# Patient Record
Sex: FEMALE | Race: WHITE | Employment: FULL TIME
[De-identification: names, ages, dates, MRNs, and addresses within clinical notes are randomized per-mention and may not be internally consistent; named-entity substitution may affect disease eponyms.]

---

## 2017-06-24 ENCOUNTER — HEALTH MAINTENANCE LETTER (OUTPATIENT)
Age: 42
End: 2017-06-24

## 2018-07-17 ENCOUNTER — NURSE TRIAGE (OUTPATIENT)
Dept: NURSING | Facility: CLINIC | Age: 43
End: 2018-07-17

## 2018-07-17 NOTE — TELEPHONE ENCOUNTER
FNA Triage Call  Presenting Problem:Pt called from [phone] about  ingrown toenail , that is worsen after self treatment in the last 24 hours , skin was growing over the nail  and  no fever .   Hx of this Nail removed x 2 , Right big toe and got a fungus and have  partially grown back but it  got ingrown . Currently : painful R big toe - look red &  swollen.     Guideline Used: ingrown toe   Patient Recommendations/Teaching: see provider in 3 days and sent to  .  .Jeny Quick RN [place] nurse advisors.            Reason for Disposition    [1] MODERATE pain (e.g., limping, interferes with normal activities) AND [2] can't locate corner of toenail    Additional Information    Negative: Doesn't match the SYMPTOMS for ingrown toenail    Negative: Patient sounds very sick or weak to the triager    Negative: [1] Looks infected (e.g., spreading redness, red streak, pus) AND [2] fever    Negative: [1] Red streaking AND [2] longer than 1 inch (2.5 cm)    Negative: [1] Skin around the nail has become red AND [2] larger than 2 inches (5 cm)    Negative: Entire toe is red    Negative: Entire toe is swollen    Negative: Yellow pus seen in skin around toenail (cuticle area), or pus seen under toenail    Negative: [1] MODERATE pain (e.g., limping, interferes with normal activities) AND [2] present > 3 days    Protocols used: TOENAIL - INGROWN-ADULT-

## 2019-10-03 ENCOUNTER — HEALTH MAINTENANCE LETTER (OUTPATIENT)
Age: 44
End: 2019-10-03

## 2020-06-03 ENCOUNTER — AMBULATORY - HEALTHEAST (OUTPATIENT)
Dept: FAMILY MEDICINE | Facility: CLINIC | Age: 45
End: 2020-06-03

## 2020-06-03 ENCOUNTER — AMBULATORY - HEALTHEAST (OUTPATIENT)
Dept: INTERNAL MEDICINE | Facility: CLINIC | Age: 45
End: 2020-06-03

## 2020-06-03 DIAGNOSIS — Z11.59 ENCOUNTER FOR SCREENING FOR OTHER VIRAL DISEASES: Primary | ICD-10-CM

## 2020-06-03 DIAGNOSIS — Z11.59 ENCOUNTER FOR SCREENING FOR OTHER VIRAL DISEASES: ICD-10-CM

## 2020-06-03 LAB
SARS-COV-2 PCR COMMENT: NORMAL
SARS-COV-2 RNA SPEC QL NAA+PROBE: NEGATIVE
SARS-COV-2 VIRUS SPECIMEN SOURCE: NORMAL

## 2020-06-03 RX ORDER — BUDESONIDE 3 MG/1
9 CAPSULE, COATED PELLETS ORAL EVERY MORNING
COMMUNITY

## 2020-06-03 RX ORDER — MULTIPLE VITAMINS W/ MINERALS TAB 9MG-400MCG
1 TAB ORAL DAILY
COMMUNITY

## 2020-06-03 RX ORDER — CHOLECALCIFEROL (VITAMIN D3) 50 MCG
1 TABLET ORAL DAILY
COMMUNITY

## 2020-06-04 ENCOUNTER — HOSPITAL ENCOUNTER (OUTPATIENT)
Facility: CLINIC | Age: 45
Discharge: HOME | End: 2020-06-04
Attending: COLON & RECTAL SURGERY | Admitting: COLON & RECTAL SURGERY
Payer: COMMERCIAL

## 2020-06-04 ENCOUNTER — ANESTHESIA (OUTPATIENT)
Dept: SURGERY | Facility: CLINIC | Age: 45
End: 2020-06-04
Payer: COMMERCIAL

## 2020-06-04 ENCOUNTER — ANESTHESIA EVENT (OUTPATIENT)
Dept: SURGERY | Facility: CLINIC | Age: 45
End: 2020-06-04
Payer: COMMERCIAL

## 2020-06-04 VITALS
SYSTOLIC BLOOD PRESSURE: 101 MMHG | WEIGHT: 142 LBS | RESPIRATION RATE: 15 BRPM | DIASTOLIC BLOOD PRESSURE: 75 MMHG | OXYGEN SATURATION: 93 % | TEMPERATURE: 98.8 F

## 2020-06-04 DIAGNOSIS — K50.113 CROHN'S DISEASE OF LARGE INTESTINE WITH FISTULA (H): Primary | ICD-10-CM

## 2020-06-04 DIAGNOSIS — K60.30 ANAL FISTULA: ICD-10-CM

## 2020-06-04 LAB — HCG UR QL: NEGATIVE

## 2020-06-04 PROCEDURE — 81025 URINE PREGNANCY TEST: CPT | Performed by: ANESTHESIOLOGY

## 2020-06-04 PROCEDURE — 37000008 ZZH ANESTHESIA TECHNICAL FEE, 1ST 30 MIN: Performed by: COLON & RECTAL SURGERY

## 2020-06-04 PROCEDURE — 25000128 H RX IP 250 OP 636

## 2020-06-04 PROCEDURE — 37000009 ZZH ANESTHESIA TECHNICAL FEE, EACH ADDTL 15 MIN: Performed by: COLON & RECTAL SURGERY

## 2020-06-04 PROCEDURE — 25000132 ZZH RX MED GY IP 250 OP 250 PS 637: Performed by: COLON & RECTAL SURGERY

## 2020-06-04 PROCEDURE — 25800030 ZZH RX IP 258 OP 636: Performed by: ANESTHESIOLOGY

## 2020-06-04 PROCEDURE — 71000027 ZZH RECOVERY PHASE 2 EACH 15 MINS: Performed by: COLON & RECTAL SURGERY

## 2020-06-04 PROCEDURE — 40000306 ZZH STATISTIC PRE PROC ASSESS II: Performed by: COLON & RECTAL SURGERY

## 2020-06-04 PROCEDURE — 25000125 ZZHC RX 250

## 2020-06-04 PROCEDURE — 25000125 ZZHC RX 250: Performed by: COLON & RECTAL SURGERY

## 2020-06-04 PROCEDURE — 27210794 ZZH OR GENERAL SUPPLY STERILE: Performed by: COLON & RECTAL SURGERY

## 2020-06-04 PROCEDURE — 25000125 ZZHC RX 250: Performed by: ANESTHESIOLOGY

## 2020-06-04 PROCEDURE — 36000052 ZZH SURGERY LEVEL 2 EA 15 ADDTL MIN: Performed by: COLON & RECTAL SURGERY

## 2020-06-04 PROCEDURE — 36000050 ZZH SURGERY LEVEL 2 1ST 30 MIN: Performed by: COLON & RECTAL SURGERY

## 2020-06-04 RX ORDER — KETAMINE HYDROCHLORIDE 10 MG/ML
INJECTION, SOLUTION INTRAMUSCULAR; INTRAVENOUS PRN
Status: DISCONTINUED | OUTPATIENT
Start: 2020-06-04 | End: 2020-06-04

## 2020-06-04 RX ORDER — ONDANSETRON 4 MG/1
4 TABLET, ORALLY DISINTEGRATING ORAL
Status: DISCONTINUED | OUTPATIENT
Start: 2020-06-04 | End: 2020-06-04 | Stop reason: HOSPADM

## 2020-06-04 RX ORDER — FENTANYL CITRATE 50 UG/ML
25-50 INJECTION, SOLUTION INTRAMUSCULAR; INTRAVENOUS
Status: DISCONTINUED | OUTPATIENT
Start: 2020-06-04 | End: 2020-06-04 | Stop reason: HOSPADM

## 2020-06-04 RX ORDER — KETOROLAC TROMETHAMINE 30 MG/ML
INJECTION, SOLUTION INTRAMUSCULAR; INTRAVENOUS PRN
Status: DISCONTINUED | OUTPATIENT
Start: 2020-06-04 | End: 2020-06-04

## 2020-06-04 RX ORDER — ONDANSETRON 2 MG/ML
INJECTION INTRAMUSCULAR; INTRAVENOUS PRN
Status: DISCONTINUED | OUTPATIENT
Start: 2020-06-04 | End: 2020-06-04

## 2020-06-04 RX ORDER — ONDANSETRON 4 MG/1
4 TABLET, ORALLY DISINTEGRATING ORAL EVERY 30 MIN PRN
Status: DISCONTINUED | OUTPATIENT
Start: 2020-06-04 | End: 2020-06-04 | Stop reason: HOSPADM

## 2020-06-04 RX ORDER — OXYCODONE HYDROCHLORIDE 5 MG/1
5 TABLET ORAL EVERY 4 HOURS PRN
Status: DISCONTINUED | OUTPATIENT
Start: 2020-06-04 | End: 2020-06-04 | Stop reason: HOSPADM

## 2020-06-04 RX ORDER — LIDOCAINE HYDROCHLORIDE 10 MG/ML
INJECTION, SOLUTION INFILTRATION; PERINEURAL PRN
Status: DISCONTINUED | OUTPATIENT
Start: 2020-06-04 | End: 2020-06-04

## 2020-06-04 RX ORDER — OXYCODONE HYDROCHLORIDE 5 MG/1
5-10 TABLET ORAL
Qty: 25 TABLET | Refills: 0 | Status: SHIPPED | OUTPATIENT
Start: 2020-06-04

## 2020-06-04 RX ORDER — FENTANYL CITRATE 50 UG/ML
INJECTION, SOLUTION INTRAMUSCULAR; INTRAVENOUS PRN
Status: DISCONTINUED | OUTPATIENT
Start: 2020-06-04 | End: 2020-06-04

## 2020-06-04 RX ORDER — SODIUM CHLORIDE, SODIUM LACTATE, POTASSIUM CHLORIDE, CALCIUM CHLORIDE 600; 310; 30; 20 MG/100ML; MG/100ML; MG/100ML; MG/100ML
INJECTION, SOLUTION INTRAVENOUS CONTINUOUS
Status: DISCONTINUED | OUTPATIENT
Start: 2020-06-04 | End: 2020-06-04 | Stop reason: HOSPADM

## 2020-06-04 RX ORDER — GLYCOPYRROLATE 0.2 MG/ML
INJECTION, SOLUTION INTRAMUSCULAR; INTRAVENOUS PRN
Status: DISCONTINUED | OUTPATIENT
Start: 2020-06-04 | End: 2020-06-04

## 2020-06-04 RX ORDER — ONDANSETRON 2 MG/ML
4 INJECTION INTRAMUSCULAR; INTRAVENOUS EVERY 30 MIN PRN
Status: DISCONTINUED | OUTPATIENT
Start: 2020-06-04 | End: 2020-06-04 | Stop reason: HOSPADM

## 2020-06-04 RX ORDER — ACETAMINOPHEN 325 MG/1
975 TABLET ORAL ONCE
Status: COMPLETED | OUTPATIENT
Start: 2020-06-04 | End: 2020-06-04

## 2020-06-04 RX ORDER — IBUPROFEN 600 MG/1
600 TABLET, FILM COATED ORAL
Status: DISCONTINUED | OUTPATIENT
Start: 2020-06-04 | End: 2020-06-04 | Stop reason: HOSPADM

## 2020-06-04 RX ORDER — ALBUTEROL SULFATE 0.83 MG/ML
2.5 SOLUTION RESPIRATORY (INHALATION) EVERY 4 HOURS PRN
Status: DISCONTINUED | OUTPATIENT
Start: 2020-06-04 | End: 2020-06-04 | Stop reason: HOSPADM

## 2020-06-04 RX ORDER — LIDOCAINE 50 MG/G
OINTMENT TOPICAL PRN
Qty: 60 G | Refills: 2 | Status: SHIPPED | OUTPATIENT
Start: 2020-06-04

## 2020-06-04 RX ORDER — LIDOCAINE 40 MG/G
CREAM TOPICAL
Status: DISCONTINUED | OUTPATIENT
Start: 2020-06-04 | End: 2020-06-04 | Stop reason: HOSPADM

## 2020-06-04 RX ORDER — FENTANYL CITRATE 50 UG/ML
25-50 INJECTION, SOLUTION INTRAMUSCULAR; INTRAVENOUS EVERY 5 MIN PRN
Status: DISCONTINUED | OUTPATIENT
Start: 2020-06-04 | End: 2020-06-04 | Stop reason: HOSPADM

## 2020-06-04 RX ORDER — NALOXONE HYDROCHLORIDE 0.4 MG/ML
.1-.4 INJECTION, SOLUTION INTRAMUSCULAR; INTRAVENOUS; SUBCUTANEOUS
Status: DISCONTINUED | OUTPATIENT
Start: 2020-06-04 | End: 2020-06-04 | Stop reason: HOSPADM

## 2020-06-04 RX ORDER — MEPERIDINE HYDROCHLORIDE 25 MG/ML
12.5 INJECTION INTRAMUSCULAR; INTRAVENOUS; SUBCUTANEOUS
Status: DISCONTINUED | OUTPATIENT
Start: 2020-06-04 | End: 2020-06-04 | Stop reason: HOSPADM

## 2020-06-04 RX ORDER — PROPOFOL 10 MG/ML
INJECTION, EMULSION INTRAVENOUS CONTINUOUS PRN
Status: DISCONTINUED | OUTPATIENT
Start: 2020-06-04 | End: 2020-06-04

## 2020-06-04 RX ORDER — HYDROMORPHONE HYDROCHLORIDE 1 MG/ML
.3-.5 INJECTION, SOLUTION INTRAMUSCULAR; INTRAVENOUS; SUBCUTANEOUS EVERY 10 MIN PRN
Status: DISCONTINUED | OUTPATIENT
Start: 2020-06-04 | End: 2020-06-04 | Stop reason: HOSPADM

## 2020-06-04 RX ORDER — SCOLOPAMINE TRANSDERMAL SYSTEM 1 MG/1
1 PATCH, EXTENDED RELEASE TRANSDERMAL ONCE
Status: DISCONTINUED | OUTPATIENT
Start: 2020-06-04 | End: 2020-06-04 | Stop reason: HOSPADM

## 2020-06-04 RX ORDER — BUPIVACAINE HYDROCHLORIDE AND EPINEPHRINE 5; 5 MG/ML; UG/ML
INJECTION, SOLUTION PERINEURAL PRN
Status: DISCONTINUED | OUTPATIENT
Start: 2020-06-04 | End: 2020-06-04 | Stop reason: HOSPADM

## 2020-06-04 RX ADMIN — LIDOCAINE HYDROCHLORIDE 50 MG: 10 INJECTION, SOLUTION INFILTRATION; PERINEURAL at 12:48

## 2020-06-04 RX ADMIN — FENTANYL CITRATE 25 MCG: 50 INJECTION, SOLUTION INTRAMUSCULAR; INTRAVENOUS at 12:55

## 2020-06-04 RX ADMIN — KETOROLAC TROMETHAMINE 30 MG: 30 INJECTION, SOLUTION INTRAMUSCULAR at 13:01

## 2020-06-04 RX ADMIN — KETAMINE HYDROCHLORIDE 10 MG: 10 INJECTION, SOLUTION INTRAMUSCULAR; INTRAVENOUS at 12:47

## 2020-06-04 RX ADMIN — FENTANYL CITRATE 25 MCG: 50 INJECTION, SOLUTION INTRAMUSCULAR; INTRAVENOUS at 13:02

## 2020-06-04 RX ADMIN — KETAMINE HYDROCHLORIDE 10 MG: 10 INJECTION, SOLUTION INTRAMUSCULAR; INTRAVENOUS at 12:52

## 2020-06-04 RX ADMIN — GLYCOPYRROLATE 0.3 MG: 0.2 INJECTION, SOLUTION INTRAMUSCULAR; INTRAVENOUS at 12:55

## 2020-06-04 RX ADMIN — MIDAZOLAM 2 MG: 1 INJECTION INTRAMUSCULAR; INTRAVENOUS at 12:36

## 2020-06-04 RX ADMIN — SODIUM CHLORIDE, POTASSIUM CHLORIDE, SODIUM LACTATE AND CALCIUM CHLORIDE: 600; 310; 30; 20 INJECTION, SOLUTION INTRAVENOUS at 12:01

## 2020-06-04 RX ADMIN — PROPOFOL 140 MCG/KG/MIN: 10 INJECTION, EMULSION INTRAVENOUS at 12:50

## 2020-06-04 RX ADMIN — ACETAMINOPHEN 975 MG: 325 TABLET, FILM COATED ORAL at 11:40

## 2020-06-04 RX ADMIN — FENTANYL CITRATE 50 MCG: 50 INJECTION, SOLUTION INTRAMUSCULAR; INTRAVENOUS at 12:42

## 2020-06-04 RX ADMIN — ONDANSETRON HYDROCHLORIDE 4 MG: 2 INJECTION, SOLUTION INTRAVENOUS at 12:53

## 2020-06-04 RX ADMIN — SCOPALAMINE 1 PATCH: 1 PATCH, EXTENDED RELEASE TRANSDERMAL at 11:35

## 2020-06-04 NOTE — ANESTHESIA CARE TRANSFER NOTE
Patient: Suri Mei    Procedure(s):  exam under anesthesia, incision and drainage of perirectal abscess with partial fistulotomy  placement of seton stitch    Diagnosis: Perirectal abscess [K61.1]  Diagnosis Additional Information: No value filed.    Anesthesia Type:   MAC     Note:  Airway :Face Mask  Patient transferred to:Phase II  Comments: Pt to Phae 2 recovery, VS, report to RNHandoff Report: Identifed the Patient, Identified the Reponsible Provider, Reviewed the pertinent medical history, Discussed the surgical course, Reviewed Intra-OP anesthesia mangement and issues during anesthesia, Set expectations for post-procedure period and Allowed opportunity for questions and acknowledgement of understanding      Vitals: (Last set prior to Anesthesia Care Transfer)    CRNA VITALS  6/4/2020 1254 - 6/4/2020 1333      6/4/2020             Resp Rate (observed):  (!) 1                Electronically Signed By: JAIME Diaz CRNA  June 4, 2020  1:33 PM

## 2020-06-04 NOTE — H&P
Pre-op History and Physical     Suri Mei MRN# [id number]   YOB: 1975 Age: 44 year old     Date of Procedure: 6/4/2020  Primary care provider: Anh Miller  Type surgery: EUA, I&D, placement of seton  Reason for Procedure: recurrent anal pain, suspected anal abscess, crohns disease  Type of Anesthesia Anticipated: Moderate Sedation    HPI:    Suri is a 44 year old female who will be undergoing the above procedure.  She has a history of untreated Crohns disease.  She has ahistory of anal pain.  Recent imaging and exam demonstrated a possible fistula in ano.  She was advised to have an exam under anesthesia.      The risks and benefits of surgery were discussed.     Allergies   Allergen Reactions     Compazine [Prochlorperazine] Other (See Comments)     dystonia     Imuran [Azathioprine] Other (See Comments)     Fever; joint pain; vomiting     Mercaptopurine Other (See Comments)     Fever; joint pain; vomiting        Current Facility-Administered Medications   Medication     lactated ringers infusion     lidocaine (LMX4) cream     lidocaine 1 % 0.1-1 mL     PRE OP antibiotics NOT needed for this surgical procedure     scopolamine (TRANSDERM) 72 hr patch 1 patch    And     scopolamine (TRANSDERM-SCOP) Patch in Place     sodium chloride (PF) 0.9% PF flush 3 mL     sodium chloride (PF) 0.9% PF flush 3 mL       Medications Prior to Admission   Medication Sig Dispense Refill Last Dose     Amoxicillin-Pot Clavulanate (AUGMENTIN PO) Take 875 mg by mouth every 12 hours   6/3/2020 at Unknown time     budesonide (ENTOCORT EC) 3 MG EC capsule Take 9 mg by mouth every morning   Past Week at Unknown time     multivitamin w/minerals (MULTI-VITAMIN) tablet Take 1 tablet by mouth daily   6/3/2020 at Unknown time     vitamin D3 (CHOLECALCIFEROL) 2000 units (50 mcg) tablet Take 1 tablet by mouth daily   6/3/2020 at Unknown time       There is no problem list on file for this patient.       Past Medical  History:   Diagnosis Date     Anemia      Complication of anesthesia     BP dropped with general anesthesia; needs more lidocaine than usual; epidural caused nausea     Noninfectious ileitis     Chron's     PONV (postoperative nausea and vomiting)      Rectal fistula      Uncomplicated asthma     sports induced        Past Surgical History:   Procedure Laterality Date     ABDOMEN SURGERY  2007    CSection     APPENDECTOMY       AS INDUCED ABORTN BY D&C       ENT SURGERY      T & A, Tubes in Ears     GI SURGERY      Colonoscpy; Endoscopy     GYN SURGERY  1988    ovarian cystectomy       Social History     Tobacco Use     Smoking status: Former Smoker     Years: 10.00     Types: Cigarettes     Smokeless tobacco: Never Used     Tobacco comment: quit June 2006   Substance Use Topics     Alcohol use: Not Currently       History reviewed. No pertinent family history.      PHYSICAL EXAM:   /69   Temp 99  F (37.2  C) (Temporal)   Resp 20   Wt 64.4 kg (142 lb)   LMP 05/12/2020   SpO2 95%  There is no height or weight on file to calculate BMI.   Mental status - alert and oriented  RESP: lungs clear  CV: RRR  AIRWAY EXAM: Mallampatti Class II (visualization of the soft palate, fauces, and uvula)  Neck ROM full  HEENT: pupils anicteric, PERRLA    IMPRESSION   ASA Class 2 - Mild systemic disease  OK for surgery.      Signed Electronically by: Checo Lo MD  June 4, 2020    Colorectal Surgery  [phone] (office)  [phone] (pager)  [email or web address]

## 2020-06-04 NOTE — ANESTHESIA PREPROCEDURE EVALUATION
Anesthesia Pre-Procedure Evaluation    Patient: Suri Mei   MRN: [id number] : 1975          Preoperative Diagnosis: Perirectal abscess [K61.1]    Procedure(s):  exam under anesthesia, possible incision and drainage of perirectal abscess  possible placement of seton    Past Medical History:   Diagnosis Date     Anemia      Complication of anesthesia     BP dropped with general anesthesia; needs more lidocaine than usual; epidural caused nausea     Noninfectious ileitis     Chron's     PONV (postoperative nausea and vomiting)      Rectal fistula      Uncomplicated asthma     sports induced     Past Surgical History:   Procedure Laterality Date     ABDOMEN SURGERY      CSection     APPENDECTOMY       AS INDUCED ABORTN BY D&C       ENT SURGERY      T & A, Tubes in Ears     GI SURGERY      Colonoscpy; Endoscopy     GYN SURGERY      ovarian cystectomy     Anesthesia Evaluation     . Pt has had prior anesthetic. Type: General    History of anesthetic complications   - PONV        ROS/MED HX    ENT/Pulmonary:     (+)Intermittent asthma Treatment: Inhaler prn,  , . .   (-) recent URI   Neurologic:  - neg neurologic ROS    (-) Neuropathy   Cardiovascular:  - neg cardiovascular ROS      (-) hypertension, syncope and irregular heartbeat/palpitations   METS/Exercise Tolerance:     Hematologic:     (+) Anemia, -      Musculoskeletal:  - neg musculoskeletal ROS       GI/Hepatic:     (+) Inflammatory bowel disease ( Chrons),       Renal/Genitourinary:  - ROS Renal section negative       Endo:  - neg endo ROS       Psychiatric:     (+) psychiatric history anxiety      Infectious Disease:  - neg infectious disease ROS       Malignancy:         Other:                          Physical Exam  Normal systems: cardiovascular, pulmonary and dental    Airway   Mallampati: II  TM distance: >3 FB  Neck ROM: full    Dental     Cardiovascular       Pulmonary             Lab Results   Component Value Date    WBC 6.0  09/10/2014    HGB 13.2 09/10/2014    HCT 39.1 09/10/2014     09/10/2014     09/10/2014    POTASSIUM 3.9 09/10/2014    CHLORIDE 103 09/10/2014    CO2 30 09/10/2014    BUN 16 09/10/2014    CR 0.85 09/10/2014    GLC 85 09/10/2014    MIKE 8.9 09/10/2014    ALBUMIN 3.9 10/04/2008    PROTTOTAL 7.3 10/04/2008    ALT 26 10/04/2008    AST 28 10/04/2008    ALKPHOS 61 10/04/2008    BILITOTAL 0.3 10/04/2008    LIPASE 141 10/04/2008    INR 0.99 10/04/2008    HCG Negative 06/04/2020       Preop Vitals  BP Readings from Last 3 Encounters:   06/04/20 110/69   09/10/14 100/67    Pulse Readings from Last 3 Encounters:   No data found for Pulse      Resp Readings from Last 3 Encounters:   06/04/20 20   09/10/14 18    SpO2 Readings from Last 3 Encounters:   06/04/20 95%   09/10/14 98%      Temp Readings from Last 1 Encounters:   06/04/20 99  F (37.2  C) (Temporal)    Ht Readings from Last 1 Encounters:   No data found for Ht      Wt Readings from Last 1 Encounters:   06/04/20 64.4 kg (142 lb)    There is no height or weight on file to calculate BMI.       Anesthesia Plan      History & Physical Review  History and physical reviewed and following examination; no interval change.    ASA Status:  2 .    NPO Status:  > 8 hours    Plan for MAC Reason for MAC:  Deep or markedly invasive procedure (G8) and Extreme anxiety (QS)  PONV prophylaxis:  Ondansetron (or other 5HT-3), Dexamethasone or Solumedrol and Scopolamine patch         Postoperative Care  Postoperative pain management:  IV analgesics.      Consents  Anesthetic plan, risks, benefits and alternatives discussed with:  Patient..                 Reese Vazquez MD                    .

## 2020-06-04 NOTE — BRIEF OP NOTE
[facility]     Brief Operative Note    Pre-operative diagnosis: Perirectal abscess [K61.1]  Post-operative diagnosis perirectal abscess, fistula in ano    Procedure: Procedure(s):  exam under anesthesia, incision and drainage of perirectal abscess with partial fistulotomy  placement of seton stitch  Surgeon: Surgeon(s) and Role:     * Checo Lo MD - Primary  Anesthesia: Monitor Anesthesia Care   Estimated blood loss: Less than 50 ml  Drains: None  Specimens: * No specimens in log *  Findings:   None.  Complications: None.  Implants: * No implants in log *

## 2020-06-04 NOTE — DISCHARGE INSTRUCTIONS
DR. NAM PRYOR M.D.  [facility] PHONE NUMBER:  [phone]    GENERAL ANESTHESIA OR SEDATION ADULT DISCHARGE INSTRUCTIONS   SPECIAL PRECAUTIONS FOR 24 HOURS AFTER SURGERY    IT IS NOT UNUSUAL TO FEEL LIGHT-HEADED OR FAINT, UP TO 24 HOURS AFTER SURGERY OR WHILE TAKING PAIN MEDICATION.  IF YOU HAVE THESE SYMPTOMS; SIT FOR A FEW MINUTES BEFORE STANDING AND HAVE SOMEONE ASSIST YOU WHEN YOU GET UP TO WALK OR USE THE BATHROOM.    YOU SHOULD REST AND RELAX FOR THE NEXT 24 HOURS AND YOU MUST MAKE ARRANGEMENTS TO HAVE SOMEONE STAY WITH YOU FOR AT LEAST 24 HOURS AFTER YOUR DISCHARGE.  AVOID HAZARDOUS AND STRENUOUS ACTIVITIES.  DO NOT MAKE IMPORTANT DECISIONS FOR 24 HOURS.    DO NOT DRIVE ANY VEHICLE OR OPERATE MECHANICAL EQUIPMENT FOR 24 HOURS FOLLOWING THE END OF YOUR SURGERY.  EVEN THOUGH YOU MAY FEEL NORMAL, YOUR REACTIONS MAY BE AFFECTED BY THE MEDICATION YOU HAVE RECEIVED.    DO NOT DRINK ALCOHOLIC BEVERAGES FOR 24 HOURS FOLLOWING YOUR SURGERY.    DRINK CLEAR LIQUIDS (APPLE JUICE, GINGER ALE, 7-UP, BROTH, ETC.).  PROGRESS TO YOUR REGULAR DIET AS YOU FEEL ABLE.    YOU MAY HAVE A DRY MOUTH, A SORE THROAT, MUSCLES ACHES OR TROUBLE SLEEPING.  THESE SHOULD GO AWAY AFTER 24 HOURS.    CALL YOUR DOCTOR FOR ANY OF THE FOLLOWING:  SIGNS OF INFECTION (FEVER, GROWING TENDERNESS AT THE SURGERY SITE, A LARGE AMOUNT OF DRAINAGE OR BLEEDING, SEVERE PAIN, FOUL-SMELLING DRAINAGE, REDNESS OR SWELLING.    IT HAS BEEN OVER 8 TO 10 HOURS SINCE SURGERY AND YOU ARE STILL NOT ABLE TO URINATE (PASS WATER).     You received Toradol, an IV form of ibuprofen (Motrin) at 1:00 PM.  Do not take any ibuprofen products until 7:00 PM  Transderm Scop (Scopolamine) Patch    The Transderm Scop patch placed behind your ear helps to prevent nausea and vomiting associated with the use of anesthesia and certain analgesics used during or after many types surgery.  You will need to remove this patch after 3 days.  However, it may be removed sooner if you are no  longer concerned about nausea or vomiting.      The most common side effects:  ?  dryness of the mouth  ?  drowsiness  ?  blurred vision  ?  dilation of pupils  ?  disorientation, memory disturbances and/or confusion  ?  dizziness  ?  restlessness  ?  hallucinations  ?  difficulty urinating  ?  skin rash  ?  red or painful eyes    Avoid drinking alcohol while using Transderm Scop patch.  Be careful about driving or operating any machinery while using this medication since it may make you drowsy.  In the unlikely event that you experience pain in the eye, which may be accompanied by widening of the pupil, eye redness and blurred vision, remove the Transderm Scop Patch immediately and consult your doctor.    Removal of Transderm Scop Patch:  To remove the patch simply peel it off your skin.  Be sure to wash your hands and the area behind your ear thoroughly with soap and water.  The Transderm Scop Patch will continue to have some active ingredient after use.  Therefore, to avoid accidental contact or ingestion by children or pets, fold the used patch in half with the sticky side together and dispose in the trash out of reach of children and pets.  If you wear contact lens, be sure to wash your hands first before handling contact lens.      Removal date:____________June 7__________.

## 2020-06-04 NOTE — ANESTHESIA POSTPROCEDURE EVALUATION
Patient: Suri Mei    Procedure(s):  exam under anesthesia, incision and drainage of perirectal abscess with partial fistulotomy  placement of seton stitch    Diagnosis:Perirectal abscess [K61.1]  Diagnosis Additional Information: No value filed.    Anesthesia Type:  MAC    Note:  Anesthesia Post Evaluation    Patient location during evaluation: Phase 2  Patient participation: Able to fully participate in evaluation  Level of consciousness: awake and alert  Pain management: adequate  Airway patency: patent  Cardiovascular status: acceptable  Respiratory status: acceptable  Hydration status: acceptable  PONV: controlled             Last vitals:  Vitals:    06/04/20 1329 06/04/20 1344 06/04/20 1400   BP: 112/75 (!) 113/90 115/82   Resp: 14 15 16   Temp: 97.3  F (36.3  C)  99.3  F (37.4  C)   SpO2: 100% 99% 95%         Electronically Signed By: Reese Vazquez MD  June 4, 2020  2:23 PM

## 2020-06-05 NOTE — OP NOTE
Procedure Date: 06/04/2020      PREOPERATIVE DIAGNOSES:   1.  Crohn's disease.   2.  Suspected fistula in ano with perirectal abscess.      POSTOPERATIVE DIAGNOSES:   1.  Left anterior ischiorectal fossa abscess.   2.  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   3.  Crohn's disease.   4.  Anal skin tags.   5.  Left lateral hypertrophied anal papilla.      PROCEDURES:   1.  Examination under anesthesia.   2.  Incision and drainage of left ischiorectal fossa abscess.   3.  Placement of a seton.   4.  Partial fistulotomy without division of muscle.      STAFF SURGEON:  Checo Lo MD      ANESTHESIA:  Monitored anesthesia care.      ESTIMATED BLOOD LOSS:  50 mL.      SPECIMENS:  None.      INDICATIONS:  Suri is a 44-year-old female with a history of untreated Crohn's disease.  She has Crohn's colitis as well as a new suspected fistula in ano with recurrent anal pain suspicious of a perirectal abscess.  She presents to the operating room today for an examination under anesthesia, possible incision and drainage of abscess, possible placement of a seton.  The risks of surgery including the risk of bleeding, infection, recurrence and alteration in control of gas and liquid stool were discussed as well as expectations regarding postoperative pain recovery were discussed as well.  She wishes to proceed.      DESCRIPTION OF PROCEDURE:  After obtaining informed consent, the patient was brought to the operating room.  She was placed in prone jackknife position on the operating table.  All pressure points were inspected and padded appropriately.  The buttocks were spread with tape, the perineum prepped and draped in the usual sterile fashion after the patient was deeply sedated.  A timeout was undertaken, which identified the patient and correct procedure.      We began by infiltrating the perirectal tissues with 30 mL of 0.5% Marcaine with dilute epinephrine for local anesthesia.  This was done as a bilateral pudendal block  as well as infiltration of the skin and subcutaneous fat around the drainage site.  She had multiple external elephant-ear skin tags noted posteriorly as well as anteriorly.  With effacement of the anus and with separation of the buttocks, we could see a small opening overlying the external sphincter that had active drainage of pus.  Digital exam demonstrated no palpable masses, but a suspicious internal opening palpated in the anterior midline.  Palpation of the vagina did not reveal any abnormalities.  Anoscopy revealed some distal rectal inflammation and an area concerning for an internal opening near the dentate line in the anterior midline.      Based on the above findings I elected to proceed with an incision and drainage of abscess.    We began by opening up the actively draining opening overlying the anterior midline.  This was done with a cruciate incision with a #15 blade.  This dropped us into a nice cavity that was now widely drained.  Palpation of the left anterior ischiorectal fossa, skin  and subcutaneous fat noted a palpable cord extending to a fluctuant area in the left anterior position.  We made an incision over this left anterior fluctuant area which was about 4 cm from the anal verge.  This also nicely opened up into a cavity with granulation tissue.  Passing a fistula probe from the external opening in the left anterior position to the external opening in the anterior midline revealed that a tract, which just involving skin and subcutaneous tissues.  Therefore, a fistulotomy was done opening up this tract, incising the skin and subcutaneous fat.  Copious amounts of granulation tissue and chronic abscess cavity were unroofed and this was gently debrided with a curet.  We then passed a fistula probe from the anterior midline external opening, which had been now widely opened, to the internal opening across the sphincter complex.  It was difficult to tell how much sphincter was involved, but I  certainly did not wish to proceed with a fistulotomy.  Therefore, a draining seton was placed with the assistance of the fistula probe.  I should note that the probe was placed under guidance with a Adamson retractor placed within the anus.  A red Vesseloop was secured to the fistula probe and then passed through the fistula.  It was secured to itself with multiple sutures of 0 silk ties.       In examining the external wound further there appeared to be a high blind tract within the bed of the external opening.  This was intubated with a fistula probe and found to be extending in the ischiorectal fossa fat.  There was some scarring along the high blind tract.  This was incised with electrocautery to fully drain the tract.     Hemostasis was assured.  Dressings were applied.  She was turned supine, awakened and extubated and transferred to the PACU in stable condition.  Lap, sponge and needle counts were correct at the end of the case x2.         CHECO LO MD             D: 2020   T: 2020   MT: PORTER      Name:     MARGOT SANTOS   MRN:      [id number]        Account:        [id number]   :      1975           Procedure Date: 2020      Document: [id number]       cc: Checo Lo MD

## 2020-06-06 ENCOUNTER — NURSE TRIAGE (OUTPATIENT)
Dept: NURSING | Facility: CLINIC | Age: 45
End: 2020-06-06

## 2020-06-06 NOTE — TELEPHONE ENCOUNTER
"Pt calling as she had surgery on 6/4/20 for: 1.  Examination under anesthesia.  2. Incision and drainage of left ischiorectal fossa abscess.  3.  Placement of a seton.  4. Partial fistulotomy without division of muscle.   She says she is not passing gas and is very bloated, no abd pain, but has been doing the after surgery protocol meds for having a stool without success and she says the wound is \"gaping\" and there is not a \"loop\" as anticipated when she looks at it. How to take care of the would daily and how to clean if she does have a BM. She is very anxious about things. Feels like there isn't any post op instructions to go by other then treatment for constipation. Has not been taking the narcotic for pain. I let the patient know if she has not heard from anyone within 20-30 minutes, to call us back.     I tried to page the on call surgeon for Checo Lo and the [facility]  said she can not page, but that Dr Checo Lo is [facility] at [phone]. To call there to have a provider paged.  3:10 pm Via the  for [facility], I paged Carole Kebede to call the patient directly at [phone]. I then called the patient to give her the number [phone] to call if she has not heard from a provider within 20 minutes. Patient voices understanding and is in agreement with this plan.       Genie Blue RN  [facility]  Triage Nurse Advisors    Additional Information    Negative: Major abdominal surgical incision and wound gaping open with visible internal organs    Negative: Sounds like a life-threatening emergency to the triager    Negative: Bleeding from incision and won't stop after 10 minutes of direct pressure    Negative: Widespread rash and bright red, sunburn-like    Negative: Severe pain in the incision    Negative: Incision gaping open and < 2 days (48 hours) since wound re-opened    Negative: Incision gaping open and " length of opening > 2 inches (5 cm)    Negative: Patient sounds very sick or weak to the triager    Negative: Sounds like a serious complication to the triager    Negative: Fever > 100.4 F (38.0 C)    Negative: Incision looks infected (spreading redness, pain)    Negative: Red streak runs from the incision and longer than 1 inch (2.5 cm)    Negative: Pus or bad-smelling fluid draining from incision    Negative: Dressing soaked with blood or body fluid (e.g., drainage)    Negative: Raised bruise and size > 2 inches (5 cm) and expanding    Caller has URGENT question and triager unable to answer question    Protocols used: POST-OP INCISION SYMPTOMS AND [id number]

## 2020-06-10 ENCOUNTER — NURSE TRIAGE (OUTPATIENT)
Dept: NURSING | Facility: CLINIC | Age: 45
End: 2020-06-10

## 2020-06-10 NOTE — TELEPHONE ENCOUNTER
" Reason for call. Pt called but didn't stay on the line and declines triage    But FNA ask her to return to ED for eval and Pt declines , wants to  Consult with anesth 1st .  .   @ 1136am FNA  Spoke to [place] surgery control desk .  Had surgery charge nurse  patricia  Spoke to Pt , and advised that specific anesthesia provider is not available today to speak to her,  but will contact her  tomorrow and Pt ok with that per [facility] at  control desk . .       Covid PCR test negative 6/3/20 . On 6/4/20 @ has rectal surgery by Checo Lo MD @ [facility]   And I have a \" huge, persisting tingling , huge bruise by IV site   And I would like to speak to anesthesia dept . \"    Pt denies further questions and will call back if triage for  Symptoms requested or questions.     Jeny Quick RN  - [place] Nurse Advisor    .    "

## 2020-06-11 NOTE — PROGRESS NOTES
Called patient regarding left hand complaints after her surgery 1 week ago.  It is the hand where the IV was placed.  Since the surgery, the hand has a bruise on the back at the IV site.  The hand is also weak and numb per patient report.  It is hard to hold her phone 2/2 whole hand and finger weakness.  The numbness involves all fingers and somewhat into the hand itself, but is most noticed in the 4th and 5th digits.  They feel like they have fallen asleep, but it is also sore.  This feeling varies in intensity.  She has tried rubbing them and heat/ice but it is not helping.    The IV (20 g) was placed in the dorsum of left hand, 1 attempt.  No apparent complications at the time.  The case was a MAC prone case for perianal abscess, again uncomplicated and uneventful.  The patient helped position herself prone with her hands resting under her head and pillow during the case. Appeared comfortable and was per report prior to the start of sedation.  Uneventful postop course.    This does not appear to be an isolated peripheral nerve injury based on the distribution of the symptoms.  They are multiple dermatomes and/or peripheral nerve distributions.  PIV placement in the dorsum of the hand would be highly unlikely to cause injury to 1 nerve, let alone multiple.  Positioning injury would also not be likely given the distribution of symptoms.  The position is not commonly associated with nerve compression and the patient helped position herself prior to sedation.  The bruising is relatively common after PIV placement and should resolve over several weeks.  I discussed watching and waiting was the symptoms should resolve over the next several weeks.  If not, I encouraged her to follow up with myself or the anesthesia team as needed. She agreed with this plan.     Reese Vazquez MD

## 2021-01-15 ENCOUNTER — HEALTH MAINTENANCE LETTER (OUTPATIENT)
Age: 46
End: 2021-01-15

## 2021-01-24 ENCOUNTER — HEALTH MAINTENANCE LETTER (OUTPATIENT)
Age: 46
End: 2021-01-24

## 2021-09-05 ENCOUNTER — HEALTH MAINTENANCE LETTER (OUTPATIENT)
Age: 46
End: 2021-09-05

## 2022-02-20 ENCOUNTER — HEALTH MAINTENANCE LETTER (OUTPATIENT)
Age: 47
End: 2022-02-20

## 2022-10-23 ENCOUNTER — HEALTH MAINTENANCE LETTER (OUTPATIENT)
Age: 47
End: 2022-10-23

## 2023-04-02 ENCOUNTER — HEALTH MAINTENANCE LETTER (OUTPATIENT)
Age: 48
End: 2023-04-02

## (undated) DEVICE — SOL WATER IRRIG 1000ML BOTTLE 2F7114

## (undated) DEVICE — SYR 10ML LL W/O NDL 302995

## (undated) DEVICE — DRAPE LAP PEDS DISP 29492

## (undated) DEVICE — LINEN FULL SHEET 5511

## (undated) DEVICE — SUCTION TIP YANKAUER W/O VENT K86

## (undated) DEVICE — GLOVE PROTEXIS POWDER FREE SMT 7.5  2D72PT75X

## (undated) DEVICE — SOL NACL 0.9% IRRIG 1000ML BOTTLE 2F7124

## (undated) DEVICE — ESU GROUND PAD ADULT W/CORD E7507

## (undated) DEVICE — DECANTER VIAL 2006S

## (undated) DEVICE — PACK MINOR CUSTOM RIDGES SBA32RMRMA

## (undated) DEVICE — SUCTION CANISTER MEDIVAC LINER 3000ML W/LID 65651-530

## (undated) DEVICE — PANTIES MESH LG/XLG 2PK 706M2

## (undated) DEVICE — SYR BULB IRRIG 50ML LATEX FREE 0035280

## (undated) DEVICE — PAD CHUX UNDERPAD 23X24" 7136

## (undated) DEVICE — GLOVE PROTEXIS W/NEU-THERA 7.5  2D73TE75

## (undated) DEVICE — SU SILK 0 24" TIE SA76G

## (undated) DEVICE — NDL ANGIOCATH 18GA 1.25" 4055

## (undated) DEVICE — BAG CLEAR TRASH 1.3M 39X33" P4040C

## (undated) DEVICE — LINEN HALF SHEET 5512

## (undated) DEVICE — PREP SKIN SCRUB TRAY 4461A

## (undated) DEVICE — TAPE DURAPORE 3" SILK 1538-3

## (undated) DEVICE — LINEN TOWEL PACK X10 5473

## (undated) DEVICE — GLOVE PROTEXIS BLUE W/NEU-THERA 7.5  2D73EB75

## (undated) DEVICE — SYR 10ML FINGER CONTROL W/O NDL 309695

## (undated) DEVICE — TUBING SUCTION 12"X1/4" N612

## (undated) DEVICE — PREP POVIDONE IODINE SOLUTION 10% 4OZ BOTTLE 29906-004

## (undated) DEVICE — PAD PERI INDIV WRAP 11" 2022A

## (undated) DEVICE — NDL BLUNT 18GA 1" W/O FILTER 305181

## (undated) DEVICE — RETR ELASTIC STAYS LONE STAR SHARP 5MM 8/PACK 3311-8G

## (undated) RX ORDER — DEXAMETHASONE SODIUM PHOSPHATE 4 MG/ML
INJECTION, SOLUTION INTRA-ARTICULAR; INTRALESIONAL; INTRAMUSCULAR; INTRAVENOUS; SOFT TISSUE
Status: DISPENSED
Start: 2020-06-04

## (undated) RX ORDER — LIDOCAINE HYDROCHLORIDE 10 MG/ML
INJECTION, SOLUTION EPIDURAL; INFILTRATION; INTRACAUDAL; PERINEURAL
Status: DISPENSED
Start: 2020-06-04

## (undated) RX ORDER — BUPIVACAINE HYDROCHLORIDE 5 MG/ML
INJECTION, SOLUTION EPIDURAL; INTRACAUDAL
Status: DISPENSED
Start: 2020-06-04

## (undated) RX ORDER — FENTANYL CITRATE 50 UG/ML
INJECTION, SOLUTION INTRAMUSCULAR; INTRAVENOUS
Status: DISPENSED
Start: 2020-06-04

## (undated) RX ORDER — GLYCOPYRROLATE 0.2 MG/ML
INJECTION INTRAMUSCULAR; INTRAVENOUS
Status: DISPENSED
Start: 2020-06-04

## (undated) RX ORDER — KETOROLAC TROMETHAMINE 30 MG/ML
INJECTION, SOLUTION INTRAMUSCULAR; INTRAVENOUS
Status: DISPENSED
Start: 2020-06-04

## (undated) RX ORDER — BUPIVACAINE HYDROCHLORIDE AND EPINEPHRINE 5; 5 MG/ML; UG/ML
INJECTION, SOLUTION EPIDURAL; INTRACAUDAL; PERINEURAL
Status: DISPENSED
Start: 2020-06-04

## (undated) RX ORDER — ACETAMINOPHEN 325 MG/1
TABLET ORAL
Status: DISPENSED
Start: 2020-06-04

## (undated) RX ORDER — PROPOFOL 10 MG/ML
INJECTION, EMULSION INTRAVENOUS
Status: DISPENSED
Start: 2020-06-04

## (undated) RX ORDER — SCOLOPAMINE TRANSDERMAL SYSTEM 1 MG/1
PATCH, EXTENDED RELEASE TRANSDERMAL
Status: DISPENSED
Start: 2020-06-04

## (undated) RX ORDER — ONDANSETRON 2 MG/ML
INJECTION INTRAMUSCULAR; INTRAVENOUS
Status: DISPENSED
Start: 2020-06-04

## (undated) RX ORDER — OXYCODONE HYDROCHLORIDE 5 MG/1
TABLET ORAL
Status: DISPENSED
Start: 2020-06-04